# Patient Record
Sex: FEMALE | Race: WHITE | NOT HISPANIC OR LATINO | Employment: UNEMPLOYED | URBAN - METROPOLITAN AREA
[De-identification: names, ages, dates, MRNs, and addresses within clinical notes are randomized per-mention and may not be internally consistent; named-entity substitution may affect disease eponyms.]

---

## 2019-03-14 ENCOUNTER — OFFICE VISIT (OUTPATIENT)
Dept: RETAIL CLINIC | Facility: CLINIC | Age: 34
End: 2019-03-14

## 2019-03-14 VITALS
RESPIRATION RATE: 18 BRPM | DIASTOLIC BLOOD PRESSURE: 84 MMHG | OXYGEN SATURATION: 98 % | SYSTOLIC BLOOD PRESSURE: 118 MMHG | HEART RATE: 83 BPM | TEMPERATURE: 97.8 F

## 2019-03-14 DIAGNOSIS — M54.2 NECK PAIN: ICD-10-CM

## 2019-03-14 DIAGNOSIS — G56.02 CARPAL TUNNEL SYNDROME OF LEFT WRIST: Primary | ICD-10-CM

## 2019-03-14 PROCEDURE — 99213 OFFICE O/P EST LOW 20 MIN: CPT | Performed by: NURSE PRACTITIONER

## 2019-03-14 NOTE — PROGRESS NOTES
Subjective:     Sherie Garcia is a 34 y.o.     Patient presents with left arm pain and numbness. She has a history of neck pain that started several months ago. She had picked up one of her children the wrong way. She was prescribed a muscle relaxer for left sided neck pain at that time. The numbness in her arm just started yeserday. It is mostly the lower arm and is intermittent. It is gone at this time. She has taken tylenol for the arm numbness and that is all/          The following portions of the patient's history were reviewed and updated as appropriate: allergies, current medications, past family history, past medical history, past social history, past surgical history and problem list.      Review of Systems   Respiratory: Negative.    Cardiovascular: Negative.    Musculoskeletal:        Left arm/hand numbness and tingling, does have a history of left neck pain intermittent and takes a muscle relaxer for that   Neurological: Negative for dizziness, tremors, speech difficulty, weakness, light-headedness, numbness and headaches.         Objective:      Physical Exam   Constitutional: She appears well-developed and well-nourished.   Cardiovascular: Normal rate, regular rhythm, S2 normal and normal heart sounds.   Pulmonary/Chest: Effort normal and breath sounds normal.   Musculoskeletal:   Numbness and tingling elicited with phalen's test left wrist   Vitals reviewed.          Diagnoses and all orders for this visit:    Carpal tunnel syndrome of left wrist  -     Ambulatory Referral to Orthopedic Surgery    Neck pain

## 2019-03-14 NOTE — PATIENT INSTRUCTIONS
Cervical Sprain  A cervical sprain is a stretch or tear in one or more of the tough, cord-like tissues that connect bones (ligaments) in the neck. Cervical sprains can range from mild to severe. Severe cervical sprains can cause the spinal bones (vertebrae) in the neck to be unstable. This can lead to spinal cord damage and can result in serious nervous system problems.  The amount of time that it takes for a cervical sprain to get better depends on the cause and extent of the injury. Most cervical sprains heal in 4-6 weeks.  What are the causes?  Cervical sprains may be caused by an injury (trauma), such as from a motor vehicle accident, a fall, or sudden forward and backward whipping movement of the head and neck (whiplash injury).  Mild cervical sprains may be caused by wear and tear over time, such as from poor posture, sitting in a chair that does not provide support, or looking up or down for long periods of time.  What increases the risk?  The following factors may make you more likely to develop this condition:  · Participating in activities that have a high risk of trauma to the neck. These include contact sports, auto racing, gymnastics, and diving.  · Taking risks when driving or riding in a motor vehicle, such as speeding.  · Having osteoarthritis of the spine.  · Having poor strength and flexibility of the neck.  · A previous neck injury.  · Having poor posture.  · Spending a lot of time in certain positions that put stress on the neck, such as sitting at a computer for long periods of time.    What are the signs or symptoms?  Symptoms of this condition include:  · Pain, soreness, stiffness, tenderness, swelling, or a burning sensation in the front, back, or sides of the neck.  · Sudden tightening of neck muscles that you cannot control (muscle spasms).  · Pain in the shoulders or upper back.  · Limited ability to move the neck.  · Headache.  · Dizziness.  · Nausea.  · Vomiting.  · Weakness, numbness,  or tingling in a hand or an arm.    Symptoms may develop right away after injury, or they may develop over a few days. In some cases, symptoms may go away with treatment and return (recur) over time.  How is this diagnosed?  This condition may be diagnosed based on:  · Your medical history.  · Your symptoms.  · Any recent injuries or known neck problems that you have, such as arthritis in the neck.  · A physical exam.  · Imaging tests, such as:  ? X-rays.  ? MRI.  ? CT scan.    How is this treated?  This condition is treated by resting and icing the injured area and doing physical therapy exercises. Depending on the severity of your condition, treatment may also include:  · Keeping your neck in place (immobilized) for periods of time. This may be done using:  ? A cervical collar. This supports your chin and the back of your head.  ? A cervical traction device. This is a sling that holds up your head. This removes weight and pressure from your neck, and it may help to relieve pain.  · Medicines that help to relieve pain and inflammation.  · Medicines that help to relax your muscles (muscle relaxants).  · Surgery. This is rare.    Follow these instructions at home:  If you have a cervical collar:  · Wear it as told by your health care provider. Do not remove the collar unless instructed by your health care provider.  · Ask your health care provider before you make any adjustments to your collar.  · If you have long hair, keep it outside of the collar.  · Ask your health care provider if you can remove the collar for cleaning and bathing. If you are allowed to remove the collar for cleaning or bathing:  ? Follow instructions from your health care provider about how to remove the collar safely.  ? Clean the collar by wiping it with mild soap and water and drying it completely.  ? If your collar has removable pads, remove them every 1-2 days and wash them by hand with soap and water. Let them air-dry completely before  you put them back in the collar.  ? Check your skin under the collar for irritation or sores. If you see any, tell your health care provider.  Managing pain, stiffness, and swelling  · If directed, use a cervical traction device as told by your health care provider.  · If directed, apply heat to the affected area before you do your physical therapy or as often as told by your health care provider. Use the heat source that your health care provider recommends, such as a moist heat pack or a heating pad.  ? Place a towel between your skin and the heat source.  ? Leave the heat on for 20-30 minutes.  ? Remove the heat if your skin turns bright red. This is especially important if you are unable to feel pain, heat, or cold. You may have a greater risk of getting burned.  · If directed, put ice on the affected area:  ? Put ice in a plastic bag.  ? Place a towel between your skin and the bag.  ? Leave the ice on for 20 minutes, 2-3 times a day.  Activity  · Do not drive while wearing a cervical collar. If you do not have a cervical collar, ask your health care provider if it is safe to drive while your neck heals.  · Do not drive or use heavy machinery while taking prescription pain medicine or muscle relaxants, unless your health care provider approves.  · Do not lift anything that is heavier than 10 lb (4.5 kg) until your health care provider tells you that it is safe.  · Rest as directed by your health care provider. Avoid positions and activities that make your symptoms worse. Ask your health care provider what activities are safe for you.  · If physical therapy was prescribed, do exercises as told by your health care provider or physical therapist.  General instructions  · Take over-the-counter and prescription medicines only as told by your health care provider.  · Do not use any products that contain nicotine or tobacco, such as cigarettes and e-cigarettes. These can delay healing. If you need help quitting, ask  your health care provider.  · Keep all follow-up visits as told by your health care provider or physical therapist. This is important.  How is this prevented?  To prevent a cervical sprain from happening again:  · Use and maintain good posture. Make any needed adjustments to your workstation to help you use good posture.  · Exercise regularly as directed by your health care provider or physical therapist.  · Avoid risky activities that may cause a cervical sprain.    Contact a health care provider if:  · You have symptoms that get worse or do not get better after 2 weeks of treatment.  · You have pain that gets worse or does not get better with medicine.  · You develop new, unexplained symptoms.  · You have sores or irritated skin on your neck from wearing your cervical collar.  Get help right away if:  · You have severe pain.  · You develop numbness, tingling, or weakness in any part of your body.  · You cannot move a part of your body (you have paralysis).  · You have neck pain along with:  ? Severe dizziness.  ? Headache.  Summary  · A cervical sprain is a stretch or tear in one or more of the tough, cord-like tissues that connect bones (ligaments) in the neck.  · Cervical sprains may be caused by an injury (trauma), such as from a motor vehicle accident, a fall, or sudden forward and backward whipping movement of the head and neck (whiplash injury).  · Symptoms may develop right away after injury, or they may develop over a few days.  · This condition is treated by resting and icing the injured area and doing physical therapy exercises.  This information is not intended to replace advice given to you by your health care provider. Make sure you discuss any questions you have with your health care provider.  Document Released: 10/14/2008 Document Revised: 08/16/2017 Document Reviewed: 08/16/2017  [employer] Interactive Patient Education © 2019 [employer]  Carpal Tunnel Syndrome  Carpal tunnel syndrome is a  condition that causes pain in your hand and arm. The carpal tunnel is a narrow area that is on the palm side of your wrist. Repeated wrist motion or certain diseases may cause swelling in the tunnel. This swelling can pinch the main nerve in the wrist (median nerve).  Follow these instructions at home:  If you have a splint:  · Wear it as told by your doctor. Remove it only as told by your doctor.  · Loosen the splint if your fingers:  ? Become numb and tingle.  ? Turn blue and cold.  · Keep the splint clean and dry.  General instructions  · Take over-the-counter and prescription medicines only as told by your doctor.  · Rest your wrist from any activity that may be causing your pain. If needed, talk to your employer about changes that can be made in your work, such as getting a wrist pad to use while typing.  · If directed, apply ice to the painful area:  ? Put ice in a plastic bag.  ? Place a towel between your skin and the bag.  ? Leave the ice on for 20 minutes, 2-3 times per day.  · Keep all follow-up visits as told by your doctor. This is important.  · Do any exercises as told by your doctor, physical therapist, or occupational therapist.  Contact a doctor if:  · You have new symptoms.  · Medicine does not help your pain.  · Your symptoms get worse.  This information is not intended to replace advice given to you by your health care provider. Make sure you discuss any questions you have with your health care provider.  Document Released: 12/06/2012 Document Revised: 07/18/2018 Document Reviewed: 05/04/2016  [employer] Interactive Patient Education © 2019 [employer]

## 2019-05-31 ENCOUNTER — TELEPHONE (OUTPATIENT)
Dept: OBSTETRICS AND GYNECOLOGY | Facility: CLINIC | Age: 34
End: 2019-05-31

## 2019-05-31 NOTE — TELEPHONE ENCOUNTER
Called pt about n/s on 5/29/19. Pt states she canceled the appointment.  Pt will call back to [email or web address]

## 2020-01-25 ENCOUNTER — OFFICE VISIT (OUTPATIENT)
Dept: RETAIL CLINIC | Facility: CLINIC | Age: 35
End: 2020-01-25

## 2020-01-25 VITALS
HEART RATE: 88 BPM | DIASTOLIC BLOOD PRESSURE: 72 MMHG | SYSTOLIC BLOOD PRESSURE: 114 MMHG | OXYGEN SATURATION: 98 % | TEMPERATURE: 98.3 F

## 2020-01-25 DIAGNOSIS — N30.90 CYSTITIS: ICD-10-CM

## 2020-01-25 DIAGNOSIS — R30.0 DYSURIA: Primary | ICD-10-CM

## 2020-01-25 LAB
BILIRUB BLD-MCNC: NEGATIVE MG/DL
CLARITY, POC: ABNORMAL
COLOR UR: ABNORMAL
GLUCOSE UR STRIP-MCNC: NEGATIVE MG/DL
KETONES UR QL: NEGATIVE
LEUKOCYTE EST, POC: ABNORMAL
NITRITE UR-MCNC: NEGATIVE MG/ML
PH UR: 6.5 [PH] (ref 5–8)
PROT UR STRIP-MCNC: ABNORMAL MG/DL
RBC # UR STRIP: ABNORMAL /UL
SP GR UR: 1.01 (ref 1–1.03)
UROBILINOGEN UR QL: NORMAL

## 2020-01-25 PROCEDURE — 99213 OFFICE O/P EST LOW 20 MIN: CPT | Performed by: NURSE PRACTITIONER

## 2020-01-25 RX ORDER — PHENAZOPYRIDINE HYDROCHLORIDE 100 MG/1
100 TABLET, FILM COATED ORAL 3 TIMES DAILY PRN
Qty: 6 TABLET | Refills: 0 | Status: SHIPPED | OUTPATIENT
Start: 2020-01-25 | End: 2020-01-27

## 2020-01-25 RX ORDER — NITROFURANTOIN 25; 75 MG/1; MG/1
100 CAPSULE ORAL 2 TIMES DAILY
Qty: 10 CAPSULE | Refills: 0 | Status: SHIPPED | OUTPATIENT
Start: 2020-01-25 | End: 2020-01-30

## 2020-01-25 RX ORDER — LORATADINE 10 MG/1
TABLET ORAL
COMMUNITY

## 2020-01-25 RX ORDER — CITALOPRAM 10 MG/1
TABLET ORAL
COMMUNITY

## 2020-01-25 NOTE — PATIENT INSTRUCTIONS
Urinary Tract Infection, Adult    A urinary tract infection (UTI) is an infection of any part of the urinary tract. The urinary tract includes the kidneys, ureters, bladder, and urethra. These organs make, store, and get rid of urine in the body.  Your health care provider may use other names to describe the infection. An upper UTI affects the ureters and kidneys (pyelonephritis). A lower UTI affects the bladder (cystitis) and urethra (urethritis).  What are the causes?  Most urinary tract infections are caused by bacteria in your genital area, around the entrance to your urinary tract (urethra). These bacteria grow and cause inflammation of your urinary tract.  What increases the risk?  You are more likely to develop this condition if:  · You have a urinary catheter that stays in place (indwelling).  · You are not able to control when you urinate or have a bowel movement (you have incontinence).  · You are female and you:  ? Use a spermicide or diaphragm for birth control.  ? Have low estrogen levels.  ? Are pregnant.  · You have certain genes that increase your risk (genetics).  · You are sexually active.  · You take antibiotic medicines.  · You have a condition that causes your flow of urine to slow down, such as:  ? An enlarged prostate, if you are male.  ? Blockage in your urethra (stricture).  ? A kidney stone.  ? A nerve condition that affects your bladder control (neurogenic bladder).  ? Not getting enough to drink, or not urinating often.  · You have certain medical conditions, such as:  ? Diabetes.  ? A weak disease-fighting system (immunesystem).  ? Sickle cell disease.  ? Gout.  ? Spinal cord injury.  What are the signs or symptoms?  Symptoms of this condition include:  · Needing to urinate right away (urgently).  · Frequent urination or passing small amounts of urine frequently.  · Pain or burning with urination.  · Blood in the urine.  · Urine that smells bad or unusual.  · Trouble urinating.  · Cloudy  urine.  · Vaginal discharge, if you are female.  · Pain in the abdomen or the lower back.  You may also have:  · Vomiting or a decreased appetite.  · Confusion.  · Irritability or tiredness.  · A fever.  · Diarrhea.  The first symptom in older adults may be confusion. In some cases, they may not have any symptoms until the infection has worsened.  How is this diagnosed?  This condition is diagnosed based on your medical history and a physical exam. You may also have other tests, including:  · Urine tests.  · Blood tests.  · Tests for sexually transmitted infections (STIs).  If you have had more than one UTI, a cystoscopy or imaging studies may be done to determine the cause of the infections.  How is this treated?  Treatment for this condition includes:  · Antibiotic medicine.  · Over-the-counter medicines to treat discomfort.  · Drinking enough water to stay hydrated.  If you have frequent infections or have other conditions such as a kidney stone, you may need to see a health care provider who specializes in the urinary tract (urologist).  In rare cases, urinary tract infections can cause sepsis. Sepsis is a life-threatening condition that occurs when the body responds to an infection. Sepsis is treated in the hospital with IV antibiotics, fluids, and other medicines.  Follow these instructions at home:    Medicines  · Take over-the-counter and prescription medicines only as told by your health care provider.  · If you were prescribed an antibiotic medicine, take it as told by your health care provider. Do not stop using the antibiotic even if you start to feel better.  General instructions  · Make sure you:  ? Empty your bladder often and completely. Do not hold urine for long periods of time.  ? Empty your bladder after sex.  ? Wipe from front to back after a bowel movement if you are female. Use each tissue one time when you wipe.  · Drink enough fluid to keep your urine pale yellow.  · Keep all follow-up  visits as told by your health care provider. This is important.  Contact a health care provider if:  · Your symptoms do not get better after 1-2 days.  · Your symptoms go away and then return.  Get help right away if you have:  · Severe pain in your back or your lower abdomen.  · A fever.  · Nausea or vomiting.  Summary  · A urinary tract infection (UTI) is an infection of any part of the urinary tract, which includes the kidneys, ureters, bladder, and urethra.  · Most urinary tract infections are caused by bacteria in your genital area, around the entrance to your urinary tract (urethra).  · Treatment for this condition often includes antibiotic medicines.  · If you were prescribed an antibiotic medicine, take it as told by your health care provider. Do not stop using the antibiotic even if you start to feel better.  · Keep all follow-up visits as told by your health care provider. This is important.  This information is not intended to replace advice given to you by your health care provider. Make sure you discuss any questions you have with your health care provider.  Document Released: 09/27/2006 Document Revised: 06/27/2019 Document Reviewed: 06/27/2019  [employer] Interactive Patient Education © 2019 [employer]

## 2020-01-25 NOTE — PROGRESS NOTES
Subjective   Sherie Wilhelm is a 34 y.o. female.     History of Present Illness Pt reports dysuria, burning during and after urination, and pelvic pressure in lower abdominal quadrants x3days. Reports one month ago had similar symptoms for 3 days, then got better then two weeks ago had same symptoms for 2-3 days then better and now here today. Has been treating with cranberry juice and water. Denies flank pain, fever, N/V/D.    The following portions of the patient's history were reviewed and updated as appropriate: allergies, current medications, past family history, past medical history, past social history, past surgical history and problem list.    Review of Systems   Constitutional: Negative.    Genitourinary: Positive for decreased urine volume, dysuria, frequency, hematuria, pelvic pressure and urgency. Negative for flank pain, pelvic pain, vaginal bleeding, vaginal discharge and vaginal pain.   Neurological: Negative.        Objective   Physical Exam   Constitutional: She is oriented to person, place, and time. She appears well-developed and well-nourished. No distress.   HENT:   Head: Normocephalic and atraumatic.   Right Ear: Hearing normal.   Left Ear: Hearing normal.   Nose: Nose normal.   Eyes: Pupils are equal, round, and reactive to light. Conjunctivae and EOM are normal.   Neck: Normal range of motion.   Cardiovascular: Normal rate, regular rhythm and normal heart sounds. Exam reveals no gallop and no friction rub.   No murmur heard.  Pulmonary/Chest: Effort normal and breath sounds normal. No respiratory distress.   Abdominal: Soft. Normal appearance and bowel sounds are normal. There is no tenderness. There is no rebound and no CVA tenderness.   Neurological: She is alert and oriented to person, place, and time.   Skin: Skin is warm and dry. She is not diaphoretic.   Psychiatric: She has a normal mood and affect.         Assessment/Plan   Diagnoses and all orders for this visit:    Dysuria  -      POC Urinalysis Dipstick    Cystitis    Other orders  -     nitrofurantoin, macrocrystal-monohydrate, (MACROBID) 100 MG capsule; Take 1 capsule by mouth 2 (Two) Times a Day for 5 days.  -     phenazopyridine (PYRIDIUM) 100 MG tablet; Take 1 tablet by mouth 3 (Three) Times a Day As Needed for Bladder Spasms for up to 2 days.        Instructed pt. To follow-up with PCP if no improvement in symptoms.

## 2021-04-16 ENCOUNTER — BULK ORDERING (OUTPATIENT)
Dept: CASE MANAGEMENT | Facility: OTHER | Age: 36
End: 2021-04-16

## 2021-04-16 DIAGNOSIS — Z23 IMMUNIZATION DUE: ICD-10-CM
